# Patient Record
Sex: FEMALE | Race: WHITE | NOT HISPANIC OR LATINO | Employment: FULL TIME | URBAN - METROPOLITAN AREA
[De-identification: names, ages, dates, MRNs, and addresses within clinical notes are randomized per-mention and may not be internally consistent; named-entity substitution may affect disease eponyms.]

---

## 2018-11-28 ENCOUNTER — TRANSFERRED RECORDS (OUTPATIENT)
Dept: HEALTH INFORMATION MANAGEMENT | Facility: CLINIC | Age: 48
End: 2018-11-28

## 2019-12-02 ENCOUNTER — TRANSFERRED RECORDS (OUTPATIENT)
Dept: HEALTH INFORMATION MANAGEMENT | Facility: CLINIC | Age: 49
End: 2019-12-02

## 2021-06-28 ENCOUNTER — TRANSFERRED RECORDS (OUTPATIENT)
Dept: HEALTH INFORMATION MANAGEMENT | Facility: CLINIC | Age: 51
End: 2021-06-28

## 2021-08-16 ENCOUNTER — TRANSCRIBE ORDERS (OUTPATIENT)
Dept: OTHER | Age: 51
End: 2021-08-16

## 2021-08-16 DIAGNOSIS — C50.919 BREAST CANCER (H): Primary | ICD-10-CM

## 2021-08-20 ENCOUNTER — PATIENT OUTREACH (OUTPATIENT)
Dept: ONCOLOGY | Facility: CLINIC | Age: 51
End: 2021-08-20

## 2021-08-20 NOTE — PROGRESS NOTES
Areli returned writer's call responding to voice message left on 8/18.   Areli is a former patient of Dr. Carson's at [facility] and would like to start a new prescription for Estrace vaginal cream.  Primary care provider would like her to screen this with medical oncologist before writing that prescription.      Patient was diagnosed with right breast IDC in 2014.  She had Oncotype of 30 and received 3 cycles of AC, then Taxol and Herceptin.  She completed Herceptin on 9/22/15.  She had radiation therapy from 1/12/2015-2/26/2015.    Areli was released from care by Dr. Carson recently and recognizes the practitioner is between clinics at the moment starting at [facility] in September.  Patient would prefer not to wait that long.  We discussed options for other medical oncology providers but this would still be a consult.      Since she has been a patient at [facility], it was recommended she call that clinic and see is a nurse practitioner or former colleague of Dr. Medina would consider this question.  Areli was agreeable to that plan and will update writer of her progress. Calls welcomed.

## 2021-08-26 NOTE — PROGRESS NOTES
Telephoned and left voice message for patient requesting call back to learn if she still needs consult with [facility].

## 2022-08-19 ENCOUNTER — TRANSCRIBE ORDERS (OUTPATIENT)
Dept: OTHER | Age: 52
End: 2022-08-19

## 2022-08-19 DIAGNOSIS — Z85.3 HISTORY OF BREAST CANCER: Primary | ICD-10-CM

## 2022-08-19 NOTE — PROGRESS NOTES
Patient has self referred again.    New Patient Oncology Nurse Navigator Note     Referring provider: self     Referring Clinic/Organization: Patient is following Dr. Carson from [facility]     Referred to (specialty:) Medical Oncology     Requested provider (if applicable): Dr. Mike Carson     Date Referral Received: August 19, 2022     Evaluation for:  Breast cancer     Clinical History (per Nurse review of records provided):      5/27/2014 -   RIGHT BREAST, 5 O'CLOCK AND 5 CM FROM NIPPLE, ULTRASOUND-GUIDED CORE   BIOPSY:   1. Infiltrating ductal carcinoma      a. [place] grade: II of III, Dolores score: 6 of 9      b. Angio-lymphatic invasion: Suspicious      c. Associated DCIS: Present      d. Subtype of DCIS: Cribriform      e. Grade of DCIS: 3   2. Estrogen receptor is positive (90% nuclear staining, moderate      intensity), progesterone receptor is positive (35% nuclear staining,      moderate intensity)   3. HER2 FISH is equivocal per outside report, see comment     COMMENT   The average number of HER2 signals per nucleus is 5.3 and the average   number of centromeres 17 signals per nucleus is 3.1 (ratio HER2/CEN17 =   1.7). This is classified as equivocal result. Additional FISH assay was   performed with additional probes (RA/1 locus) which also yielded equivocal   results. Immunohistochemical stain for HER2 is warranted on this core   biopsy if the block becomes available. Alternatively, repeat testing may be   performed on the surgical specimen.     6/2/2014 - Met with Brittnee Carreno of [facility] for genetic counseling. She elected to proceed with BRCAPlus testing.  Genetic Testing Result: NEGATIVE  Areli is negative for mutations in BRCA1, BRCA2, TP53, CDH1, STK11, PTEN. No mutations were found in any of these six genes analyzed. She does not have Hereditary Breast and Ovarian Cancer Syndrome (BRCA1, BRCA2), Li Fraumeni Syndrome (TP53), Hereditary Diffuse Gastric  Cancer (CDH1), Peutz-Jegher's Syndrome (STK11), or Cowden Syndrome (PTEN).    Met with Dr. Majo Watkins on 6/9/2014.     Dr. Merary Szymanski ordered Oncotype on Specimen [id number] with RS 30 (report is on chart)    6/30/2014 - Dr. Watkins performed right lumpectomy IDC, 1.7 cm, Grade 3, 1/2 lymph nodes involved, with 0.4 cm tumor. ER+ (30%), [place] positive (35%),     AC started on 7/22/2014 and finished after three cycles because of progressive symptoms.  Taxol and Herceptin on 9/16/2014 and completed Taxol on 12/2/2014. Herceptin completed 9/22/2015.  Started Femara 9/23/15 to 10/5/15, then restarted on 12/15/15.      Radiation therapy 1/12/2015 through 2/26/15.    Letrozole 2015, then anastrozole December 2018 through June 2019, then exemestane.    Last visit with Dr. Carson appears to have been on 6/8/2020.     Records Location: Care Everywhere, Media and See Bookmarked material     Records Needed: [facility] from [id number] (assume most recent in 2021)  Most recent Yam[facility] note in Media is 6/8/2020

## 2022-08-25 ENCOUNTER — PATIENT OUTREACH (OUTPATIENT)
Dept: ONCOLOGY | Facility: CLINIC | Age: 52
End: 2022-08-25

## 2022-08-25 ENCOUNTER — TELEPHONE (OUTPATIENT)
Dept: ONCOLOGY | Facility: CLINIC | Age: 52
End: 2022-08-25

## 2022-08-25 NOTE — PROGRESS NOTES
"RN CARE COORDINATION  Surgical Oncology       Incoming Call:   Received call from Areli stating she had been given Dr. Ethan Dee's name by  [facility]. She would like to come off her maintenance drug that she has been on for eight years. She was then told by her insurance company Dr. Carson is not in network for her now that Dr. Carson is with [facility]. She was given Dr. Dee's name by [employer] and was told he should be able to \"consult\" with Dr. Carson about her history and possible treatment recommendations.     Discussed with Areli that Dr. Dee is a Surgical Oncologist not a Medical Oncologist and would not be an appropriate provider to discuss her treatment plan. Encouraged Areli to reach back out to her insurance company since Dr. Carson should be in network with [employer]. Reviewed with Areli the process for being set up with a new oncologist in the event she could not be seen by Dr. Carson. Provided New Patient Intake phone number and encouraged her to call and speak with Chiara Santo RN. Chiara is the Nurse Navigator for breast cancer and has spoken to Areli in the past. Areli verbalized understanding of the recommendations.         ANGELINA Bingham, RN  RN Care Coordinator  [facility]  Surgical Oncology      "

## 2022-08-26 NOTE — PROGRESS NOTES
When contacted by New Patient Scheduling patient declined to schedule and requested call back from writer.  Telephoned and left voice message for patient requesting call back.

## 2022-08-26 NOTE — PROGRESS NOTES
Areli telephoned returning call.   She has been on exemestane for 8 years.  Her primary care provider has suggested she seek the advice of medical oncologist to determine the benefits of continuing medication for a full ten years.  Patient is interested in seeing Dr. Carson for discussion of duration of time she should continue using exemestane.      Patient has been informed by her insurance company that Dr. Carson's colleagues are in network, but she is not.  Areli is not the first patient who has stated this.      Areli will call cost of care estimates line to learn her out of pocket obligation if she were to see Dr. Carson before update with [employer] insurance is accomplished.  She will assess that and call writer if she would like to proceed with scheduling.  I did inform her Dr. Carson would want to see her in person, not virtually for this.  Chiara Santo RN

## 2022-09-07 ENCOUNTER — DOCUMENTATION ONLY (OUTPATIENT)
Dept: ONCOLOGY | Facility: CLINIC | Age: 52
End: 2022-09-07

## 2022-09-07 NOTE — PROGRESS NOTES
Action    Action Taken 9/7/2022 10:13AM NIKKI - IB Message from JAG Guadarrama   I called [facility] to see if they have an RANDY on file for the pt.   [phone]  -I spoke to Jorge and she confirmed that they will need a new RANDY.     I called pt Areli- she will work on a new RANDY for [facility]      The Internet cut out- I left a vm for Areli requesting a call back- she will need to provide her email address.     11:55AM NIKKI Lamesa called me back- I emailed her the RANDY form.   Email: [email or web address]    9/14/2022 3:17pm NIKKI    Areli's RANDY is illegible. I called Areli to let her know - she will work on sending a better copy of the release soon.     9/15/2022 3:20pm NIKKI   I faxed pt Areli's RANDY form to [facility] along with a formal request form.     9/16/2022 9:21AM NIKKI   I faxed 80 pages of [facility] records to [facility].

## 2022-09-15 ENCOUNTER — TRANSFERRED RECORDS (OUTPATIENT)
Dept: HEALTH INFORMATION MANAGEMENT | Facility: CLINIC | Age: 52
End: 2022-09-15

## 2022-09-23 NOTE — PROGRESS NOTES
Telephoned and left voice message for patient to update her on [facility] network and Dr. Carson (no progress but [facility] communicating with carrier).  Welcomed calls but informed her referral will be placed on hold until we hear from her.  Referral can be reactivated if she wishes to proceed.   9/23/22 - 12:50 - Patient called back and was transferred to New Patient Scheduling to complete scheduling with Dr. Carson.  Chiara Santo RN

## 2022-10-03 NOTE — PROGRESS NOTES
RECORDS STATUS - BREAST    RECORDS REQUESTED FROM: [facility]/ [facility]    DATE REQUESTED: 10/4/2022    Action    Action Taken 10/3/2022 7:23AM NIKKI     I called [facility] IM[facility] dept Ph: [phone] - they will push images to [facility] PACS     I called [facility] to see if they have an RANDY on file for pt Areli   [phone] - unavailable. I tried calling two more times but still no luck.     I called pt Areli - unavailable    9:09AM KECHAN   I called [facility] again.. I spoke to Jorge in the Medical Records Dept and she will send recent records. The most recent office note is from June 8th 2020. She will send records to the fax: [phone] Attn: Anne     9:21AM KECHAN   I called our internal file room to have the breast images resolved in PACS. I resolved the other images from [facility]. [phone]    10/3/2022 11:51AM KECHAN   I received records from [facility] and faxed them off to [facility].       NOTES DETAILS STATUS   OFFICE NOTE from referring provider     OFFICE NOTE from medical oncologist I faxed records from [facility] to [facility] on 10/3/2022 11:52AM NIKKI Some [facility] Records are in [facility]   OFFICE NOTE from surgeon Complete See Breast Biopsy in EPIC   OFFICE NOTE from radiation oncologist     DISCHARGE SUMMARY from hospital     DISCHARGE REPORT from the ER     OPERATIVE REPORT Complete See Breast Biopsy in EPIC   MEDICATION LIST Complete [facility]   CLINICAL TRIAL TREATMENTS TO DATE     LABS     REQUEST BLOCKS FOR ALL BREAST CANCER PTS     PATHOLOGY REPORTS  (Tissue diagnosis, Stage, ER/NV percentage positive and intensity of staining, HER2 IHC, FISH, and all biopsies from breast and any distant metastasis)                 Complete- See Breast Biopsy in EPIC 5/27/2014  Right breast at 5:00 - 5 cm from nipple, ultrasound guided core biopsy -   [place] grade 2  invasive ductal carcinoma    GENONOMIC TESTING     TYPE:   (Next Generation Sequencing, including Foundation One testing, and Oncotype score) Complete Labs  last updated on 6/28/2022    Her 2 Tatianna Fish 5/27/2014    IMAGING (NEED IMAGES & REPORT)     CT SCANS Complete - [facility]  CT Cardiac 1/17/2022   MRI Complete - [facility]  MRI Breast 3/19/2018    MAMMO Complete - [facility]  11/29/2021 more in PACS   ULTRASOUND Complete- [facility]  US Breast 9/8/2016   PET     BONE SCAN     BRAIN MRI

## 2022-10-04 ENCOUNTER — PRE VISIT (OUTPATIENT)
Dept: ONCOLOGY | Facility: CLINIC | Age: 52
End: 2022-10-04

## 2022-10-04 ENCOUNTER — ONCOLOGY VISIT (OUTPATIENT)
Dept: ONCOLOGY | Facility: CLINIC | Age: 52
End: 2022-10-04
Attending: INTERNAL MEDICINE
Payer: COMMERCIAL

## 2022-10-04 VITALS
WEIGHT: 199.6 LBS | SYSTOLIC BLOOD PRESSURE: 127 MMHG | HEART RATE: 71 BPM | OXYGEN SATURATION: 98 % | TEMPERATURE: 97.9 F | RESPIRATION RATE: 16 BRPM | HEIGHT: 67 IN | BODY MASS INDEX: 31.33 KG/M2 | DIASTOLIC BLOOD PRESSURE: 88 MMHG

## 2022-10-04 DIAGNOSIS — C50.211 MALIGNANT NEOPLASM OF UPPER-INNER QUADRANT OF RIGHT BREAST IN FEMALE, ESTROGEN RECEPTOR POSITIVE (H): Primary | ICD-10-CM

## 2022-10-04 DIAGNOSIS — Z17.0 MALIGNANT NEOPLASM OF UPPER-INNER QUADRANT OF RIGHT BREAST IN FEMALE, ESTROGEN RECEPTOR POSITIVE (H): Primary | ICD-10-CM

## 2022-10-04 PROCEDURE — 99204 OFFICE O/P NEW MOD 45 MIN: CPT | Performed by: INTERNAL MEDICINE

## 2022-10-04 PROCEDURE — G0463 HOSPITAL OUTPT CLINIC VISIT: HCPCS

## 2022-10-04 RX ORDER — SIMVASTATIN 20 MG
TABLET ORAL
COMMUNITY
Start: 2022-09-13 | End: 2022-10-16

## 2022-10-04 RX ORDER — OCTANOIC ACID 100 %
LIQUID (ML) MISCELLANEOUS
COMMUNITY

## 2022-10-04 RX ORDER — EXEMESTANE 25 MG/1
TABLET ORAL
COMMUNITY
Start: 2022-09-14 | End: 2022-10-04

## 2022-10-04 ASSESSMENT — PAIN SCALES - GENERAL: PAINLEVEL: NO PAIN (0)

## 2022-10-04 NOTE — LETTER
"    10/4/2022         RE: Areli Hickman  [address]  [place] [address]        Dear Colleague,    Thank you for referring your patient, Areli Hickman, to the [facility]. Please see a copy of my visit note below.    Oncology Rooming Note    October 4, 2022 10:09 AM   Areli Hickman is a 52 year old female who presents for:    Chief Complaint   Patient presents with     Oncology Clinic Visit     Breast cancer (H)     Initial Vitals: /88   Pulse 71   Temp 97.9  F (36.6  C) (Oral)   Resp 16   Ht 1.689 m (5' 6.5\")   Wt 90.5 kg (199 lb 9.6 oz)   SpO2 98%   BMI 31.73 kg/m   Estimated body mass index is 31.73 kg/m  as calculated from the following:    Height as of this encounter: 1.689 m (5' 6.5\").    Weight as of this encounter: 90.5 kg (199 lb 9.6 oz). Body surface area is 2.06 meters squared.  No Pain (0) Comment: Data Unavailable   No LMP recorded.  Allergies reviewed: Yes  Medications reviewed: Yes    Medications: Medication refills not needed today.  Pharmacy name entered into [employer]: [facility] #[id number] [id number] [id number]    Clinical concerns: None       Sarah Butt MA                [facility]    Hematology/Oncology New Patient Note      Today's Date: 10/04/22    Reason for Consult: breast cancer      HISTORY OF PRESENT ILLNESS: Areli  Is a very pleasant 52 year old female with a history Of breast cancer.    Oncologic history is as follows    1.Right breast invasive ductal carcinoma Dolores grade 2 ER/OR positive HER2/matt equivocal    3. HER2 FISH is equivocal per outside report, see comment      COMMENT   The average number of HER2 signals per nucleus is 5.3 and the average   number of centromeres 17 signals per nucleus is 3.1 (ratio HER2/CEN17 =   1.7). This is classified as equivocal result. Additional FISH assay was   performed with additional probes (RA/1 locus) which also yielded equivocal   results. " Immunohistochemical stain for HER2 is warranted on this core   biopsy if the block becomes available. Alternatively, repeat testing may be   performed on the surgical specimen.       2.  Status post lumpectomy on June 30, 2014 final pathology showed invasive ductal carcinoma 1.7 cm grade 3 1 out of 2 lymph nodes involved 0.4 cm tumor in the lymph node ER +30% TN +35% HER2/matt negative  3.  Status post AC followed by Taxol Herceptin completed in 2015 started Femara from September 2015 to October 2015 then restarted in December 2015  4.  Radiation therapy from January 2015 to February 2015  5.  Letrozole 2015 on anastrozole December 2018 through 2019 and exemestane currently on exemestane  5 dexa November 2021 normal  6 mammogram 11/2021 normal     Interval history:Areli is here to establish care.  She had a lot of hot flashes on the exemestane and is currently taking it.  She feels otherwise well.We also discussed her cholesterol in detail and she was asked to increase her Crestor in January due to slightly elevated LDL and states that she has any myalgias related to the Crestor.  She would like to go down on her hyperlipidemia medications.      REVIEW OF SYSTEMS:   14 point ROS was reviewed and is negative other than as noted above in HPI.       HOME MEDICATIONS:  Current Outpatient Medications   Medication Sig Dispense Refill     Calcium-Magnesium-Vitamin D (CALCIUM MAGNESIUM PO) Take by mouth 3 times daily       Caprylic Acid LIQD        Cholecalciferol (VITAMIN D) 1000 UNITS capsule Take 2 capsules by mouth daily       Flaxseed, Linseed, (FLAX SEED OIL PO) 2 capsules daily       glucosamine-chondroitin 500-400 MG CAPS Take 1 capsule by mouth daily       Multiple vitamin TABS Take by mouth daily       Omega-3 Fatty Acids (OMEGA-3 FISH OIL PO)        propranolol ER (INDERAL LA) 60 MG 24 hr capsule Take 120 mg by mouth daily        simvastatin (ZOCOR) 20 MG tablet        TURMERIC PO 1 capsules daily            ALLERGIES:  No Known Allergies      PAST MEDICAL HISTORY:  Past Medical History:   Diagnosis Date     Breast cancer (H)      Essential tremor      NO ACTIVE PROBLEMS      Shingles          PAST SURGICAL HISTORY:  Past Surgical History:   Procedure Laterality Date     NO HISTORY OF SURGERY       RHINOPLASTY           SOCIAL HISTORY:  Social History     Socioeconomic History     Marital status:      Spouse name: Not on file     Number of children: Not on file     Years of education: Not on file     Highest education level: Not on file   Occupational History     Not on file   Tobacco Use     Smoking status: Never Smoker     Smokeless tobacco: Not on file   Substance and Sexual Activity     Alcohol use: No     Drug use: No     Sexual activity: Yes     Partners: Male   Other Topics Concern     Parent/sibling w/ CABG, MI or angioplasty before 65F 55M? Not Asked      Service Not Asked     Blood Transfusions Not Asked     Caffeine Concern No     Comment: occ      Occupational Exposure Not Asked     Hobby Hazards Not Asked     Sleep Concern No     Stress Concern No     Weight Concern No     Special Diet No     Back Care Not Asked     Exercise Yes     Comment: 2x weekly goes to gym      Bike Helmet Not Asked     Seat Belt Yes     Self-Exams Not Asked   Social History Narrative     Not on file     Social Determinants of Health     Financial Resource Strain: Not on file   Food Insecurity: Not on file   Transportation Needs: Not on file   Physical Activity: Not on file   Stress: Not on file   Social Connections: Not on file   Intimate Partner Violence: Not on file   Housing Stability: Not on file         FAMILY HISTORY:  Family History   Problem Relation Age of Onset     Hypertension Mother      Obesity Mother      Lipids Father      Obesity Sister      Cerebrovascular Disease Sister         ?     Cancer Other      Cardiovascular Other          PHYSICAL EXAM:  Vital signs:  /88   Pulse 71   Temp  "97.9  F (36.6  C) (Oral)   Resp 16   Ht 1.689 m (5' 6.5\")   Wt 90.5 kg (199 lb 9.6 oz)   SpO2 98%   BMI 31.73 kg/m     ECO  GENERAL/CONSTITUTIONAL: No acute distress.  EYES: Pupils are equal, round, and react to light and accommodation. Extraocular movements intact.  No scleral icterus.  ENT/MOUTH: Neck supple. Oropharynx clear, no mucositis.  LYMPH: No anterior cervical, posterior cervical, supraclavicular, axillary or inguinal adenopathy.   RESPIRATORY: Clear to auscultation bilaterally. No crackles or wheezing.   CARDIOVASCULAR: Regular rate and rhythm without murmurs, gallops, or rubs.  GASTROINTESTINAL: No hepatosplenomegaly, masses, or tenderness. The patient has normal bowel sounds. No guarding.  No distention.  MUSCULOSKELETAL: Warm and well-perfused, no cyanosis, clubbing, or edema.  NEUROLOGIC: Cranial nerves II-XII are intact. Alert, oriented, answers questions appropriately.  INTEGUMENTARY: No rashes or jaundice.  GAIT: Steady, does not use assistive device  Bilateral breast exam is normal    LABS:  CBC RESULTS:   Recent Labs   Pending    PATHOLOGY:  As per hPI      IMAGING:  Mammogram 2021 normal     ASSESSMENT/PLAN:  Areli Hickman is a 52 year old female with breast cancer     1) breast cancer stop Exemestane she has done 8 years of therapy. No data to support further treatment would be beneficial. mammogram in November    2) cholesteroll: Hyperlipidemia may have been related to the exemestane.  Asked her to stop the medication and have it rechecked by her primary care physician she may consider decreasing the Crestor from 20 mg to 10 mg with a recheck and if stable at that point she can remain on 10 mg a day she will take this further up with her primary care physician    3.  Bone density scan bone health).  Continue calcium and vitamin D    See me in a year, will schedule lab draw in July and see me in august   Mike Carson MD  Hematology/Oncology  [facility]      Again, thank you for allowing me to participate in the care of your patient.        Sincerely,        Mike Carson MD

## 2022-10-04 NOTE — PROGRESS NOTES
"Oncology Rooming Note    October 4, 2022 10:09 AM   Areli Hickman is a 52 year old female who presents for:    Chief Complaint   Patient presents with     Oncology Clinic Visit     Breast cancer (H)     Initial Vitals: /88   Pulse 71   Temp 97.9  F (36.6  C) (Oral)   Resp 16   Ht 1.689 m (5' 6.5\")   Wt 90.5 kg (199 lb 9.6 oz)   SpO2 98%   BMI 31.73 kg/m   Estimated body mass index is 31.73 kg/m  as calculated from the following:    Height as of this encounter: 1.689 m (5' 6.5\").    Weight as of this encounter: 90.5 kg (199 lb 9.6 oz). Body surface area is 2.06 meters squared.  No Pain (0) Comment: Data Unavailable   No LMP recorded.  Allergies reviewed: Yes  Medications reviewed: Yes    Medications: Medication refills not needed today.  Pharmacy name entered into [employer]: [facility] #[id number] [place] [id number] [id number]    Clinical concerns: None       Sarah Butt MA              "

## 2022-10-05 NOTE — PROGRESS NOTES
[facility]    Hematology/Oncology New Patient Note      Today's Date: 10/04/22    Reason for Consult: breast cancer      HISTORY OF PRESENT ILLNESS: Areli  Is a very pleasant 52 year old female with a history Of breast cancer.    Oncologic history is as follows    1.Right breast invasive ductal carcinoma [place] grade 2 ER/CT positive HER2/matt equivocal    3. HER2 FISH is equivocal per outside report, see comment      COMMENT   The average number of HER2 signals per nucleus is 5.3 and the average   number of centromeres 17 signals per nucleus is 3.1 (ratio HER2/CEN17 =   1.7). This is classified as equivocal result. Additional FISH assay was   performed with additional probes (RA/1 locus) which also yielded equivocal   results. Immunohistochemical stain for HER2 is warranted on this core   biopsy if the block becomes available. Alternatively, repeat testing may be   performed on the surgical specimen.       2.  Status post lumpectomy on June 30, 2014 final pathology showed invasive ductal carcinoma 1.7 cm grade 3 1 out of 2 lymph nodes involved 0.4 cm tumor in the lymph node ER +30% CT +35% HER2/matt negative  3.  Status post AC followed by Taxol Herceptin completed in 2015 started Femara from September 2015 to October 2015 then restarted in December 2015  4.  Radiation therapy from January 2015 to February 2015  5.  Letrozole 2015 on anastrozole December 2018 through 2019 and exemestane currently on exemestane  5 dexa November 2021 normal  6 mammogram 11/2021 normal     Interval history:Areli is here to establish care.  She had a lot of hot flashes on the exemestane and is currently taking it.  She feels otherwise well.We also discussed her cholesterol in detail and she was asked to increase her Crestor in January due to slightly elevated LDL and states that she has any myalgias related to the Crestor.  She would like to go down on her hyperlipidemia medications.      REVIEW OF SYSTEMS:    14 point ROS was reviewed and is negative other than as noted above in HPI.       HOME MEDICATIONS:  Current Outpatient Medications   Medication Sig Dispense Refill     Calcium-Magnesium-Vitamin D (CALCIUM MAGNESIUM PO) Take by mouth 3 times daily       Caprylic Acid LIQD        Cholecalciferol (VITAMIN D) 1000 UNITS capsule Take 2 capsules by mouth daily       Flaxseed, Linseed, (FLAX SEED OIL PO) 2 capsules daily       glucosamine-chondroitin 500-400 MG CAPS Take 1 capsule by mouth daily       Multiple vitamin TABS Take by mouth daily       Omega-3 Fatty Acids (OMEGA-3 FISH OIL PO)        propranolol ER (INDERAL LA) 60 MG 24 hr capsule Take 120 mg by mouth daily        simvastatin (ZOCOR) 20 MG tablet        TURMERIC PO 1 capsules daily           ALLERGIES:  No Known Allergies      PAST MEDICAL HISTORY:  Past Medical History:   Diagnosis Date     Breast cancer (H)      Essential tremor      NO ACTIVE PROBLEMS      Shingles          PAST SURGICAL HISTORY:  Past Surgical History:   Procedure Laterality Date     NO HISTORY OF SURGERY       RHINOPLASTY           SOCIAL HISTORY:  Social History     Socioeconomic History     Marital status:      Spouse name: Not on file     Number of children: Not on file     Years of education: Not on file     Highest education level: Not on file   Occupational History     Not on file   Tobacco Use     Smoking status: Never Smoker     Smokeless tobacco: Not on file   Substance and Sexual Activity     Alcohol use: No     Drug use: No     Sexual activity: Yes     Partners: Male   Other Topics Concern     Parent/sibling w/ CABG, MI or angioplasty before 65F 55M? Not Asked      Service Not Asked     Blood Transfusions Not Asked     Caffeine Concern No     Comment: occ      Occupational Exposure Not Asked     Hobby Hazards Not Asked     Sleep Concern No     Stress Concern No     Weight Concern No     Special Diet No     Back Care Not Asked     Exercise Yes     Comment: 2x  "weekly goes to gym      Bike Helmet Not Asked     Seat Belt Yes     Self-Exams Not Asked   Social History Narrative     Not on file     Social Determinants of Health     Financial Resource Strain: Not on file   Food Insecurity: Not on file   Transportation Needs: Not on file   Physical Activity: Not on file   Stress: Not on file   Social Connections: Not on file   Intimate Partner Violence: Not on file   Housing Stability: Not on file         FAMILY HISTORY:  Family History   Problem Relation Age of Onset     Hypertension Mother      Obesity Mother      Lipids Father      Obesity Sister      Cerebrovascular Disease Sister         ?     Cancer Other      Cardiovascular Other          PHYSICAL EXAM:  Vital signs:  /88   Pulse 71   Temp 97.9  F (36.6  C) (Oral)   Resp 16   Ht 1.689 m (5' 6.5\")   Wt 90.5 kg (199 lb 9.6 oz)   SpO2 98%   BMI 31.73 kg/m     ECO  GENERAL/CONSTITUTIONAL: No acute distress.  EYES: Pupils are equal, round, and react to light and accommodation. Extraocular movements intact.  No scleral icterus.  ENT/MOUTH: Neck supple. Oropharynx clear, no mucositis.  LYMPH: No anterior cervical, posterior cervical, supraclavicular, axillary or inguinal adenopathy.   RESPIRATORY: Clear to auscultation bilaterally. No crackles or wheezing.   CARDIOVASCULAR: Regular rate and rhythm without murmurs, gallops, or rubs.  GASTROINTESTINAL: No hepatosplenomegaly, masses, or tenderness. The patient has normal bowel sounds. No guarding.  No distention.  MUSCULOSKELETAL: Warm and well-perfused, no cyanosis, clubbing, or edema.  NEUROLOGIC: Cranial nerves II-XII are intact. Alert, oriented, answers questions appropriately.  INTEGUMENTARY: No rashes or jaundice.  GAIT: Steady, does not use assistive device  Bilateral breast exam is normal    LABS:  CBC RESULTS:   Recent Labs   Pending    PATHOLOGY:  As per hPI      IMAGING:  Mammogram 2021 normal     ASSESSMENT/PLAN:  Areli Hickman is a 52 year old " female with breast cancer     1) breast cancer stop Exemestane she has done 8 years of therapy. No data to support further treatment would be beneficial. mammogram in November    2) cholesteroll: Hyperlipidemia may have been related to the exemestane.  Asked her to stop the medication and have it rechecked by her primary care physician she may consider decreasing the Crestor from 20 mg to 10 mg with a recheck and if stable at that point she can remain on 10 mg a day she will take this further up with her primary care physician    3.  Bone density scan bone health).  Continue calcium and vitamin D    See me in a year, will schedule lab draw in July and see me in august   Mike Carson MD  Hematology/Oncology  [facility]

## 2022-10-07 ENCOUNTER — MYC MEDICAL ADVICE (OUTPATIENT)
Dept: ONCOLOGY | Facility: CLINIC | Age: 52
End: 2022-10-07

## 2022-10-07 NOTE — TELEPHONE ENCOUNTER
Left voicemail (1st) and sent a [employer] message (1st) for scheduling.     - Mammogram in November, 2022   - See Dr. Carson in 1 year (around 08/23) with labs at your PCP (primary care provider) in 06/23 or 07/23

## 2022-10-13 ENCOUNTER — MYC MEDICAL ADVICE (OUTPATIENT)
Dept: ONCOLOGY | Facility: CLINIC | Age: 52
End: 2022-10-13

## 2022-10-13 DIAGNOSIS — C50.211 MALIGNANT NEOPLASM OF UPPER-INNER QUADRANT OF RIGHT BREAST IN FEMALE, ESTROGEN RECEPTOR POSITIVE (H): Primary | ICD-10-CM

## 2022-10-13 DIAGNOSIS — Z17.0 MALIGNANT NEOPLASM OF UPPER-INNER QUADRANT OF RIGHT BREAST IN FEMALE, ESTROGEN RECEPTOR POSITIVE (H): Primary | ICD-10-CM

## 2022-10-16 DIAGNOSIS — Z17.0 MALIGNANT NEOPLASM OF UPPER-INNER QUADRANT OF RIGHT BREAST IN FEMALE, ESTROGEN RECEPTOR POSITIVE (H): Primary | ICD-10-CM

## 2022-10-16 DIAGNOSIS — C50.211 MALIGNANT NEOPLASM OF UPPER-INNER QUADRANT OF RIGHT BREAST IN FEMALE, ESTROGEN RECEPTOR POSITIVE (H): Primary | ICD-10-CM

## 2022-10-16 RX ORDER — SIMVASTATIN 20 MG
10 TABLET ORAL AT BEDTIME
Qty: 90 TABLET | Refills: 3 | Status: SHIPPED | OUTPATIENT
Start: 2022-10-16 | End: 2022-10-19 | Stop reason: DRUGHIGH

## 2022-10-18 NOTE — TELEPHONE ENCOUNTER
Patient is calling requesting a new script of simvastatin 10 mg sent to the pharmacy. She is not able to cut the 20 mg in half due to hand tremors. Pharmacy does have the 10 mg dose in stock.They are not able to dispense the 10 mg without a new script. Patient is now out of medication. Please review and send in new script when able.     Charis Martinez RN, BSN, PHN  [facility]

## 2022-10-19 DIAGNOSIS — E78.5 ELEVATED LIPIDS: Primary | ICD-10-CM

## 2022-10-19 RX ORDER — SIMVASTATIN 10 MG
10 TABLET ORAL AT BEDTIME
Qty: 90 TABLET | Refills: 3 | Status: SHIPPED | OUTPATIENT
Start: 2022-10-19

## 2022-10-19 RX ORDER — SIMVASTATIN 10 MG
10 TABLET ORAL AT BEDTIME
Qty: 90 TABLET | Refills: 1 | Status: SHIPPED | OUTPATIENT
Start: 2022-10-19

## 2023-01-14 ENCOUNTER — HEALTH MAINTENANCE LETTER (OUTPATIENT)
Age: 53
End: 2023-01-14

## 2023-09-24 ENCOUNTER — HEALTH MAINTENANCE LETTER (OUTPATIENT)
Age: 53
End: 2023-09-24

## 2023-10-12 ENCOUNTER — TELEPHONE (OUTPATIENT)
Dept: ONCOLOGY | Facility: CLINIC | Age: 53
End: 2023-10-12
Payer: COMMERCIAL

## 2023-10-12 NOTE — TELEPHONE ENCOUNTER
Areli called clinic as she had an exam with her primary NP who noticed that her Iron level was elevated. Areli is also due for follow up labs and exam with Dr. Carson. She is aware that Dr. Carson is a hematologist also and can address the elevated Iron level. She is schedule for more labs and a liver ultrasound prior to her visit with Dr. Carson. She is scheduled for follow up with Dr. Carosn on 110/10/23. Afsaneh Khalil RN,BSN,OCN,CBCN

## 2023-11-10 ENCOUNTER — ONCOLOGY VISIT (OUTPATIENT)
Dept: ONCOLOGY | Facility: CLINIC | Age: 53
End: 2023-11-10
Attending: INTERNAL MEDICINE
Payer: COMMERCIAL

## 2023-11-10 VITALS
WEIGHT: 149.2 LBS | DIASTOLIC BLOOD PRESSURE: 73 MMHG | SYSTOLIC BLOOD PRESSURE: 113 MMHG | HEART RATE: 65 BPM | HEIGHT: 66 IN | RESPIRATION RATE: 16 BRPM | BODY MASS INDEX: 23.98 KG/M2 | OXYGEN SATURATION: 98 %

## 2023-11-10 DIAGNOSIS — R79.89 ELEVATED FERRITIN: ICD-10-CM

## 2023-11-10 DIAGNOSIS — E78.00 HYPERCHOLESTEREMIA: ICD-10-CM

## 2023-11-10 DIAGNOSIS — Z17.0 MALIGNANT NEOPLASM OF UPPER-INNER QUADRANT OF RIGHT BREAST IN FEMALE, ESTROGEN RECEPTOR POSITIVE (H): Primary | ICD-10-CM

## 2023-11-10 DIAGNOSIS — C50.211 MALIGNANT NEOPLASM OF UPPER-INNER QUADRANT OF RIGHT BREAST IN FEMALE, ESTROGEN RECEPTOR POSITIVE (H): Primary | ICD-10-CM

## 2023-11-10 LAB — FERRITIN SERPL-MCNC: 433 NG/ML (ref 11–328)

## 2023-11-10 PROCEDURE — G0463 HOSPITAL OUTPT CLINIC VISIT: HCPCS | Performed by: INTERNAL MEDICINE

## 2023-11-10 PROCEDURE — 36415 COLL VENOUS BLD VENIPUNCTURE: CPT | Performed by: INTERNAL MEDICINE

## 2023-11-10 PROCEDURE — 82728 ASSAY OF FERRITIN: CPT | Performed by: INTERNAL MEDICINE

## 2023-11-10 PROCEDURE — 99215 OFFICE O/P EST HI 40 MIN: CPT | Performed by: INTERNAL MEDICINE

## 2023-11-10 RX ORDER — SIMVASTATIN 10 MG
10 TABLET ORAL AT BEDTIME
Qty: 90 TABLET | Refills: 3 | Status: SHIPPED | OUTPATIENT
Start: 2023-11-10

## 2023-11-10 NOTE — LETTER
11/10/2023         RE: Areli Hickman  [address]  [place] [address]        Dear Colleague,    Thank you for referring your patient, Areli Hickman, to the [facility]. Please see a copy of my visit note below.    [facility]    Hematology/Oncology return patient note    Today's Date: 11/10/2022  Reason for Consult: breast cancer      HISTORY OF PRESENT ILLNESS: Areli  Is a very pleasant 53 year old female with a history Of breast cancer.    Oncologic history is as follows    1.Right breast invasive ductal carcinoma [place] grade 2 ER/[place] positive HER2/matt equivocal    3. HER2 FISH is equivocal per outside report, see comment      COMMENT   The average number of HER2 signals per nucleus is 5.3 and the average   number of centromeres 17 signals per nucleus is 3.1 (ratio HER2/CEN17 =   1.7). This is classified as equivocal result. Additional FISH assay was   performed with additional probes (RA/1 locus) which also yielded equivocal   results. Immunohistochemical stain for HER2 is warranted on this core   biopsy if the block becomes available. Alternatively, repeat testing may be   performed on the surgical specimen.       2.  Status post lumpectomy on June 30, 2014 final pathology showed invasive ductal carcinoma 1.7 cm grade 3 1 out of 2 lymph nodes involved 0.4 cm tumor in the lymph node ER +30% [place] +35% HER2/matt negative  3.  Status post AC followed by Taxol Herceptin completed in 2015 started Femara from September 2015 to October 2015 then restarted in December 2015  4.  Radiation therapy from January 2015 to February 2015  5.  Letrozole 2015 on anastrozole December 2018 through 2019 and exemestane Status post exemestane completed in October 2022 She completed 8 years of therapy  5 dexa November 2021 normal  6 mammogram 11/2021 normal     Interval history:Areli is here for follow up. She had elevated Ferritin She went to her primary care physician as she  was having significant amount of hair loss.  She is on a shake diet and has lost about 60 pounds over the last year.  Ferritin was elevated at 461 2 years ago it was at 367.  She stopped taking the statins last year on her own.   Her iron sat is normal . She is seeing derm and follow up and has a new ear area that was biopsied    REVIEW OF SYSTEMS:   14 point ROS was reviewed and is negative other than as noted above in HPI.       HOME MEDICATIONS:  Current Outpatient Medications   Medication Sig Dispense Refill     Calcium-Magnesium-Vitamin D (CALCIUM MAGNESIUM PO) Take by mouth 3 times daily       Caprylic Acid LIQD        Cholecalciferol (VITAMIN D) 1000 UNITS capsule Take 2 capsules by mouth daily       Flaxseed, Linseed, (FLAX SEED OIL PO) 2 capsules daily       glucosamine-chondroitin 500-400 MG CAPS Take 1 capsule by mouth daily       propranolol ER (INDERAL LA) 60 MG 24 hr capsule Take 120 mg by mouth daily        simvastatin (ZOCOR) 10 MG tablet Take 1 tablet (10 mg) by mouth At Bedtime 90 tablet 3     TURMERIC PO 1 capsules daily       simvastatin (ZOCOR) 10 MG tablet Take 1 tablet (10 mg) by mouth At Bedtime (Patient not taking: Reported on 11/10/2023) 90 tablet 1         ALLERGIES:  No Known Allergies      PAST MEDICAL HISTORY:  Past Medical History:   Diagnosis Date     Breast cancer (H)      Essential tremor      NO ACTIVE PROBLEMS      Shingles          PAST SURGICAL HISTORY:  Past Surgical History:   Procedure Laterality Date     NO HISTORY OF SURGERY       RHINOPLASTY           SOCIAL HISTORY:  Social History     Socioeconomic History     Marital status:      Spouse name: Not on file     Number of children: Not on file     Years of education: Not on file     Highest education level: Not on file   Occupational History     Not on file   Tobacco Use     Smoking status: Never     Smokeless tobacco: Not on file   Substance and Sexual Activity     Alcohol use: No     Drug use: No     Sexual  activity: Yes     Partners: Male   Other Topics Concern     Parent/sibling w/ CABG, MI or angioplasty before 65F 55M? Not Asked      Service Not Asked     Blood Transfusions Not Asked     Caffeine Concern No     Comment: occ      Occupational Exposure Not Asked     Hobby Hazards Not Asked     Sleep Concern No     Stress Concern No     Weight Concern No     Special Diet No     Back Care Not Asked     Exercise Yes     Comment: 2x weekly goes to gym      Bike Helmet Not Asked     Seat Belt Yes     Self-Exams Not Asked   Social History Narrative     Not on file     Social Determinants of Health     Financial Resource Strain: Not on file   Food Insecurity: Not on file   Transportation Needs: Not on file   Physical Activity: Not on file   Stress: Not on file   Social Connections: Not on file   Interpersonal Safety: Not on file   Housing Stability: Not on file         FAMILY HISTORY:  Family History   Problem Relation Age of Onset     Hypertension Mother      Obesity Mother      Lipids Father      Obesity Sister      Cerebrovascular Disease Sister         ?     Cancer Other      Cardiovascular Other          PHYSICAL EXAM:  Vital signs:  noted   ECO  GENERAL/CONSTITUTIONAL: No acute distress.  EYES: Pupils are equal, round, and react to light and accommodation. Extraocular movements intact.  No scleral icterus.  ENT/MOUTH: Neck supple. Oropharynx clear, no mucositis.  LYMPH: No anterior cervical, posterior cervical, supraclavicular, axillary or inguinal adenopathy.   RESPIRATORY: Clear to auscultation bilaterally. No crackles or wheezing.   CARDIOVASCULAR: Regular rate and rhythm without murmurs, gallops, or rubs.  GASTROINTESTINAL: No hepatosplenomegaly, masses, or tenderness. The patient has normal bowel sounds. No guarding.  No distention.  MUSCULOSKELETAL: Warm and well-perfused, no cyanosis, clubbing, or edema.  NEUROLOGIC: Cranial nerves II-XII are intact. Alert, oriented, answers questions  "appropriately.  INTEGUMENTARY: No rashes or jaundice.  GAIT: Steady, does not use assistive device  Bilateral breast exam is normal  Biopsy right ear    LABS:  CBC RESULTS:   Recent Labs   Pending    PATHOLOGY:  As per hPI      IMAGING:  Mammogram 11/2021 normal     ASSESSMENT/PLAN:  Areli Hickman is a 52 year old female with breast cancer     1) breast cancer   On surveillance  Labs mammogram and md visit in year    2 ) elevated LDL  Restart zocor  Recheck in a  year    3) elevated ferritin  Liver ultrasound negative for fatty liver  If remains elevated (>> may have had covid) will do ct and bone scan to rule out mets    Total time spent on day of visit, including review of tests, obtaining/reviewing separately obtained history, ordering medications/tests/procedures, communicating with PCP/consultants, and documenting in electronic medical record: 40 minutes   Mike Carson MD  Hematology/Oncology  [facility]    Oncology Rooming Note    November 10, 2023 10:30 AM   Areli Hickman is a 53 year old female who presents for:    Chief Complaint   Patient presents with     Oncology Clinic Visit     Breast cancer (H)     Initial Vitals: /73   Pulse 65   Resp 16   Ht 1.676 m (5' 6\")   Wt 67.7 kg (149 lb 3.2 oz)   SpO2 98%   BMI 24.08 kg/m   Estimated body mass index is 24.08 kg/m  as calculated from the following:    Height as of this encounter: 1.676 m (5' 6\").    Weight as of this encounter: 67.7 kg (149 lb 3.2 oz). Body surface area is 1.78 meters squared.  Data Unavailable Comment: Data Unavailable   No LMP recorded.  Allergies reviewed: Yes  Medications reviewed: Yes    Medications: Medication refills not needed today.  Pharmacy name entered into [employer]: [id number] [id number] [facility] [address]    Clinical concerns: Areli has some concerns about her rising iron levels. She has been taking protein shakes for weight loss and is wondering if this has " contributed.      Sarah Butt MA              Again, thank you for allowing me to participate in the care of your patient.        Sincerely,        Mike Carson MD

## 2023-11-10 NOTE — PROGRESS NOTES
[facility]    Hematology/Oncology return patient note    Today's Date: 11/10/2022  Reason for Consult: breast cancer      HISTORY OF PRESENT ILLNESS: Areli  Is a very pleasant 53 year old female with a history Of breast cancer.    Oncologic history is as follows    1.Right breast invasive ductal carcinoma [place] grade 2 ER/[place] positive HER2/matt equivocal    3. HER2 FISH is equivocal per outside report, see comment      COMMENT   The average number of HER2 signals per nucleus is 5.3 and the average   number of centromeres 17 signals per nucleus is 3.1 (ratio HER2/CEN17 =   1.7). This is classified as equivocal result. Additional FISH assay was   performed with additional probes (RA/1 locus) which also yielded equivocal   results. Immunohistochemical stain for HER2 is warranted on this core   biopsy if the block becomes available. Alternatively, repeat testing may be   performed on the surgical specimen.       2.  Status post lumpectomy on June 30, 2014 final pathology showed invasive ductal carcinoma 1.7 cm grade 3 1 out of 2 lymph nodes involved 0.4 cm tumor in the lymph node ER +30% [place] +35% HER2/matt negative  3.  Status post AC followed by Taxol Herceptin completed in 2015 started Femara from September 2015 to October 2015 then restarted in December 2015  4.  Radiation therapy from January 2015 to February 2015  5.  Letrozole 2015 on anastrozole December 2018 through 2019 and exemestane Status post exemestane completed in October 2022 She completed 8 years of therapy  5 dexa November 2021 normal  6 mammogram 11/2021 normal     Interval history:Areli is here for follow up. She had elevated Ferritin She went to her primary care physician as she was having significant amount of hair loss.  She is on a shake diet and has lost about 60 pounds over the last year.  Ferritin was elevated at 461 2 years ago it was at 367.  She stopped taking the statins last year on her own.   Her iron sat is  normal . She is seeing derm and follow up and has a new ear area that was biopsied    REVIEW OF SYSTEMS:   14 point ROS was reviewed and is negative other than as noted above in HPI.       HOME MEDICATIONS:  Current Outpatient Medications   Medication Sig Dispense Refill    Calcium-Magnesium-Vitamin D (CALCIUM MAGNESIUM PO) Take by mouth 3 times daily      Caprylic Acid LIQD       Cholecalciferol (VITAMIN D) 1000 UNITS capsule Take 2 capsules by mouth daily      Flaxseed, Linseed, (FLAX SEED OIL PO) 2 capsules daily      glucosamine-chondroitin 500-400 MG CAPS Take 1 capsule by mouth daily      propranolol ER (INDERAL LA) 60 MG 24 hr capsule Take 120 mg by mouth daily       simvastatin (ZOCOR) 10 MG tablet Take 1 tablet (10 mg) by mouth At Bedtime 90 tablet 3    TURMERIC PO 1 capsules daily      simvastatin (ZOCOR) 10 MG tablet Take 1 tablet (10 mg) by mouth At Bedtime (Patient not taking: Reported on 11/10/2023) 90 tablet 1         ALLERGIES:  No Known Allergies      PAST MEDICAL HISTORY:  Past Medical History:   Diagnosis Date    Breast cancer (H)     Essential tremor     NO ACTIVE PROBLEMS     Shingles          PAST SURGICAL HISTORY:  Past Surgical History:   Procedure Laterality Date    NO HISTORY OF SURGERY      RHINOPLASTY           SOCIAL HISTORY:  Social History     Socioeconomic History    Marital status:      Spouse name: Not on file    Number of children: Not on file    Years of education: Not on file    Highest education level: Not on file   Occupational History    Not on file   Tobacco Use    Smoking status: Never    Smokeless tobacco: Not on file   Substance and Sexual Activity    Alcohol use: No    Drug use: No    Sexual activity: Yes     Partners: Male   Other Topics Concern    Parent/sibling w/ CABG, MI or angioplasty before 65F 55M? Not Asked     Service Not Asked    Blood Transfusions Not Asked    Caffeine Concern No     Comment: occ     Occupational Exposure Not Asked    Hobby  Hazards Not Asked    Sleep Concern No    Stress Concern No    Weight Concern No    Special Diet No    Back Care Not Asked    Exercise Yes     Comment: 2x weekly goes to gym     Bike Helmet Not Asked    Seat Belt Yes    Self-Exams Not Asked   Social History Narrative    Not on file     Social Determinants of Health     Financial Resource Strain: Not on file   Food Insecurity: Not on file   Transportation Needs: Not on file   Physical Activity: Not on file   Stress: Not on file   Social Connections: Not on file   Interpersonal Safety: Not on file   Housing Stability: Not on file         FAMILY HISTORY:  Family History   Problem Relation Age of Onset    Hypertension Mother     Obesity Mother     Lipids Father     Obesity Sister     Cerebrovascular Disease Sister         ?    Cancer Other     Cardiovascular Other          PHYSICAL EXAM:  Vital signs:  noted   ECO  GENERAL/CONSTITUTIONAL: No acute distress.  EYES: Pupils are equal, round, and react to light and accommodation. Extraocular movements intact.  No scleral icterus.  ENT/MOUTH: Neck supple. Oropharynx clear, no mucositis.  LYMPH: No anterior cervical, posterior cervical, supraclavicular, axillary or inguinal adenopathy.   RESPIRATORY: Clear to auscultation bilaterally. No crackles or wheezing.   CARDIOVASCULAR: Regular rate and rhythm without murmurs, gallops, or rubs.  GASTROINTESTINAL: No hepatosplenomegaly, masses, or tenderness. The patient has normal bowel sounds. No guarding.  No distention.  MUSCULOSKELETAL: Warm and well-perfused, no cyanosis, clubbing, or edema.  NEUROLOGIC: Cranial nerves II-XII are intact. Alert, oriented, answers questions appropriately.  INTEGUMENTARY: No rashes or jaundice.  GAIT: Steady, does not use assistive device  Bilateral breast exam is normal  Biopsy right ear    LABS:  CBC RESULTS:   Recent Labs   Pending    PATHOLOGY:  As per hPI      IMAGING:  Mammogram 2021 normal     ASSESSMENT/PLAN:  Areli Hickman is a 52 year  old female with breast cancer     1) breast cancer   On surveillance  Labs mammogram and md visit in year    2 ) elevated LDL  Restart zocor  Recheck in a  year    3) elevated ferritin  Liver ultrasound negative for fatty liver  If remains elevated (>> may have had covid) will do ct and bone scan to rule out mets    Total time spent on day of visit, including review of tests, obtaining/reviewing separately obtained history, ordering medications/tests/procedures, communicating with PCP/consultants, and documenting in electronic medical record: 40 minutes   Mike Carson MD  Hematology/Oncology  [facility]

## 2023-11-10 NOTE — PROGRESS NOTES
"Oncology Rooming Note    November 10, 2023 10:30 AM   Areli Hickman is a 53 year old female who presents for:    Chief Complaint   Patient presents with    Oncology Clinic Visit     Breast cancer (H)     Initial Vitals: /73   Pulse 65   Resp 16   Ht 1.676 m (5' 6\")   Wt 67.7 kg (149 lb 3.2 oz)   SpO2 98%   BMI 24.08 kg/m   Estimated body mass index is 24.08 kg/m  as calculated from the following:    Height as of this encounter: 1.676 m (5' 6\").    Weight as of this encounter: 67.7 kg (149 lb 3.2 oz). Body surface area is 1.78 meters squared.  Data Unavailable Comment: Data Unavailable   No LMP recorded.  Allergies reviewed: Yes  Medications reviewed: Yes    Medications: Medication refills not needed today.  Pharmacy name entered into [employer]: [facility] #[id number] [place] [id number]    Clinical concerns: Areli has some concerns about her rising iron levels. She has been taking protein shakes for weight loss and is wondering if this has contributed.      Sarah Butt MA            "

## 2023-11-14 DIAGNOSIS — C50.211 MALIGNANT NEOPLASM OF UPPER-INNER QUADRANT OF RIGHT BREAST IN FEMALE, ESTROGEN RECEPTOR POSITIVE (H): Primary | ICD-10-CM

## 2023-11-14 DIAGNOSIS — R79.89 ELEVATED FERRITIN: ICD-10-CM

## 2023-11-14 DIAGNOSIS — Z17.0 MALIGNANT NEOPLASM OF UPPER-INNER QUADRANT OF RIGHT BREAST IN FEMALE, ESTROGEN RECEPTOR POSITIVE (H): Primary | ICD-10-CM

## 2023-11-14 DIAGNOSIS — M54.50 LOW BACK PAIN, UNSPECIFIED BACK PAIN LATERALITY, UNSPECIFIED CHRONICITY, UNSPECIFIED WHETHER SCIATICA PRESENT: ICD-10-CM

## 2023-11-22 ENCOUNTER — HOSPITAL ENCOUNTER (OUTPATIENT)
Dept: NUCLEAR MEDICINE | Facility: CLINIC | Age: 53
Setting detail: NUCLEAR MEDICINE
Discharge: HOME | End: 2023-11-22
Attending: INTERNAL MEDICINE
Payer: COMMERCIAL

## 2023-11-22 ENCOUNTER — HOSPITAL ENCOUNTER (OUTPATIENT)
Dept: CT IMAGING | Facility: CLINIC | Age: 53
Discharge: HOME | End: 2023-11-22
Attending: INTERNAL MEDICINE | Admitting: INTERNAL MEDICINE
Payer: COMMERCIAL

## 2023-11-22 DIAGNOSIS — M54.50 LOW BACK PAIN, UNSPECIFIED BACK PAIN LATERALITY, UNSPECIFIED CHRONICITY, UNSPECIFIED WHETHER SCIATICA PRESENT: ICD-10-CM

## 2023-11-22 DIAGNOSIS — R79.89 ELEVATED FERRITIN: ICD-10-CM

## 2023-11-22 DIAGNOSIS — Z17.0 MALIGNANT NEOPLASM OF UPPER-INNER QUADRANT OF RIGHT BREAST IN FEMALE, ESTROGEN RECEPTOR POSITIVE (H): ICD-10-CM

## 2023-11-22 DIAGNOSIS — C50.211 MALIGNANT NEOPLASM OF UPPER-INNER QUADRANT OF RIGHT BREAST IN FEMALE, ESTROGEN RECEPTOR POSITIVE (H): ICD-10-CM

## 2023-11-22 PROCEDURE — 343N000001 HC RX 343: Performed by: INTERNAL MEDICINE

## 2023-11-22 PROCEDURE — 250N000009 HC RX 250: Performed by: INTERNAL MEDICINE

## 2023-11-22 PROCEDURE — A9503 TC99M MEDRONATE: HCPCS | Performed by: INTERNAL MEDICINE

## 2023-11-22 PROCEDURE — 74177 CT ABD & PELVIS W/CONTRAST: CPT

## 2023-11-22 PROCEDURE — 250N000011 HC RX IP 250 OP 636: Performed by: INTERNAL MEDICINE

## 2023-11-22 PROCEDURE — 78306 BONE IMAGING WHOLE BODY: CPT

## 2023-11-22 RX ORDER — TC 99M MEDRONATE 20 MG/10ML
25 INJECTION, POWDER, LYOPHILIZED, FOR SOLUTION INTRAVENOUS ONCE
Status: COMPLETED | OUTPATIENT
Start: 2023-11-22 | End: 2023-11-22

## 2023-11-22 RX ORDER — IOPAMIDOL 755 MG/ML
74 INJECTION, SOLUTION INTRAVASCULAR ONCE
Status: COMPLETED | OUTPATIENT
Start: 2023-11-22 | End: 2023-11-22

## 2023-11-22 RX ADMIN — IOPAMIDOL 74 ML: 755 INJECTION, SOLUTION INTRAVENOUS at 10:27

## 2023-11-22 RX ADMIN — SODIUM CHLORIDE 61 ML: 9 INJECTION, SOLUTION INTRAVENOUS at 10:28

## 2023-11-22 RX ADMIN — TC 99M MEDRONATE 25.7 MILLICURIE: 20 INJECTION, POWDER, LYOPHILIZED, FOR SOLUTION INTRAVENOUS at 10:10

## 2023-12-21 ENCOUNTER — TELEPHONE (OUTPATIENT)
Dept: ONCOLOGY | Facility: CLINIC | Age: 53
End: 2023-12-21

## 2023-12-21 DIAGNOSIS — R89.9 ABNORMAL LABORATORY TEST: Primary | ICD-10-CM

## 2023-12-21 NOTE — TELEPHONE ENCOUNTER
Areli called clinic inquiring about her elevated Ferritin when she was in clinic last. Bone Scan and CT were done per Dr. Carson regarding elevated Ferritin. Writer sent patient a MyChart that results were normal. Patient also had a mammogram, bone density drawn which are not visible in Careeverywhere. Requested that she have those records faxed.          Areli would like to have a repeat Ferritin level drawn. Level will be drawn at [facility] and  writer will follow up. Afsaneh Khalil RN,BSN,OCN,CBCN

## 2023-12-22 ENCOUNTER — LAB (OUTPATIENT)
Dept: LAB | Facility: CLINIC | Age: 53
End: 2023-12-22
Payer: COMMERCIAL

## 2023-12-22 DIAGNOSIS — R89.9 ABNORMAL LABORATORY TEST: ICD-10-CM

## 2023-12-22 LAB — FERRITIN SERPL-MCNC: 439 NG/ML (ref 11–328)

## 2023-12-22 PROCEDURE — 36415 COLL VENOUS BLD VENIPUNCTURE: CPT

## 2023-12-22 PROCEDURE — 82728 ASSAY OF FERRITIN: CPT

## 2023-12-28 ENCOUNTER — TELEPHONE (OUTPATIENT)
Dept: ONCOLOGY | Facility: CLINIC | Age: 53
End: 2023-12-28
Payer: COMMERCIAL

## 2023-12-28 DIAGNOSIS — R89.9 ABNORMAL LABORATORY TEST: Primary | ICD-10-CM

## 2023-12-28 NOTE — TELEPHONE ENCOUNTER
Called Areli left her a message that a [place] message was sent with Dr. Carson's explanation of her current Ferritin level. Recommendation is follow up in 6 months to 1 year. Per Dr. Carson's last dictation she is due for labs and follow up in 11 2024. Writer will add Ferritin level to those labs. Afsaneh Khalil RN,BSN,OCN,CBCN

## 2024-09-08 ENCOUNTER — HEALTH MAINTENANCE LETTER (OUTPATIENT)
Age: 54
End: 2024-09-08

## 2024-09-19 DIAGNOSIS — Z13.1 ENCOUNTER FOR SCREENING EXAMINATION FOR IMPAIRED GLUCOSE REGULATION AND DIABETES MELLITUS: Primary | ICD-10-CM

## 2024-11-11 DIAGNOSIS — Z17.0 MALIGNANT NEOPLASM OF UPPER-INNER QUADRANT OF RIGHT BREAST IN FEMALE, ESTROGEN RECEPTOR POSITIVE (H): ICD-10-CM

## 2024-11-11 DIAGNOSIS — R79.89 ELEVATED FERRITIN: ICD-10-CM

## 2024-11-11 DIAGNOSIS — C50.211 MALIGNANT NEOPLASM OF UPPER-INNER QUADRANT OF RIGHT BREAST IN FEMALE, ESTROGEN RECEPTOR POSITIVE (H): ICD-10-CM

## 2024-11-11 DIAGNOSIS — E78.00 HYPERCHOLESTEREMIA: ICD-10-CM

## 2024-11-21 RX ORDER — SIMVASTATIN 10 MG
10 TABLET ORAL AT BEDTIME
Qty: 90 TABLET | Refills: 3 | OUTPATIENT
Start: 2024-11-21

## 2024-12-04 ENCOUNTER — ONCOLOGY VISIT (OUTPATIENT)
Dept: ONCOLOGY | Facility: CLINIC | Age: 54
End: 2024-12-04
Attending: INTERNAL MEDICINE
Payer: COMMERCIAL

## 2024-12-04 VITALS
SYSTOLIC BLOOD PRESSURE: 132 MMHG | RESPIRATION RATE: 14 BRPM | HEART RATE: 75 BPM | BODY MASS INDEX: 27.83 KG/M2 | TEMPERATURE: 97.9 F | OXYGEN SATURATION: 94 % | WEIGHT: 172.4 LBS | DIASTOLIC BLOOD PRESSURE: 82 MMHG

## 2024-12-04 DIAGNOSIS — Z17.0 MALIGNANT NEOPLASM OF UPPER-INNER QUADRANT OF RIGHT BREAST IN FEMALE, ESTROGEN RECEPTOR POSITIVE (H): Primary | ICD-10-CM

## 2024-12-04 DIAGNOSIS — C50.211 MALIGNANT NEOPLASM OF UPPER-INNER QUADRANT OF RIGHT BREAST IN FEMALE, ESTROGEN RECEPTOR POSITIVE (H): Primary | ICD-10-CM

## 2024-12-04 PROCEDURE — 99215 OFFICE O/P EST HI 40 MIN: CPT | Performed by: INTERNAL MEDICINE

## 2024-12-04 PROCEDURE — G2211 COMPLEX E/M VISIT ADD ON: HCPCS | Performed by: INTERNAL MEDICINE

## 2024-12-04 PROCEDURE — G0463 HOSPITAL OUTPT CLINIC VISIT: HCPCS | Performed by: INTERNAL MEDICINE

## 2024-12-04 RX ORDER — ATORVASTATIN CALCIUM 10 MG/1
10 TABLET, FILM COATED ORAL DAILY
COMMUNITY
Start: 2024-11-15

## 2024-12-04 ASSESSMENT — PAIN SCALES - GENERAL: PAINLEVEL_OUTOF10: NO PAIN (0)

## 2024-12-04 NOTE — PROGRESS NOTES
[facility]    Hematology/Oncology return patient note    Today's Date: 12/04/2024  Reason for Consult: breast cancer      HISTORY OF PRESENT ILLNESS: Areli  Is a very pleasant 54 year old female with a history Of breast cancer.    Oncologic history is as follows    1.Right breast invasive ductal carcinoma [place] grade 2 ER/[place] positive HER2/matt equivocal    3. HER2 FISH is equivocal per outside report, see comment      COMMENT   The average number of HER2 signals per nucleus is 5.3 and the average   number of centromeres 17 signals per nucleus is 3.1 (ratio HER2/CEN17 =   1.7). This is classified as equivocal result. Additional FISH assay was   performed with additional probes (RA/1 locus) which also yielded equivocal   results. Immunohistochemical stain for HER2 is warranted on this core   biopsy if the block becomes available. Alternatively, repeat testing may be   performed on the surgical specimen.       2.  Status post lumpectomy on June 30, 2014 final pathology showed invasive ductal carcinoma 1.7 cm grade 3 1 out of 2 lymph nodes involved 0.4 cm tumor in the lymph node ER +30% WI +35% HER2/matt negative  3.  Status post AC followed by Taxol Herceptin completed in 2015 started Femara from September 2015 to October 2015 then restarted in December 2015  4.  Radiation therapy from January 2015 to February 2015  5.  Letrozole 2015 on anastrozole December 2018 through 2019 and exemestane Status post exemestane completed in October 2022 She completed 8 years of therapy on sureillance        Interval history:Areli is here for follow up she is doing well.  her pcp changed her statin as they think it would work better. Markers and labs are normal     REVIEW OF SYSTEMS:   14 point ROS was reviewed and is negative other than as noted above in HPI.       HOME MEDICATIONS:  Current Outpatient Medications   Medication Sig Dispense Refill    atorvastatin (LIPITOR) 10 MG tablet Take 10 mg by  mouth daily.      Calcium-Magnesium-Vitamin D (CALCIUM MAGNESIUM PO) Take by mouth 3 times daily      Caprylic Acid LIQD       Cholecalciferol (VITAMIN D) 1000 UNITS capsule Take 2 capsules by mouth daily      Flaxseed, Linseed, (FLAX SEED OIL PO) 2 capsules daily      glucosamine-chondroitin 500-400 MG CAPS Take 1 capsule by mouth daily      propranolol ER (INDERAL LA) 60 MG 24 hr capsule Take 120 mg by mouth daily       TURMERIC PO 1 capsules daily           ALLERGIES:  No Known Allergies      PAST MEDICAL HISTORY:  Past Medical History:   Diagnosis Date    Breast cancer (H)     Essential tremor     NO ACTIVE PROBLEMS     Shingles          PAST SURGICAL HISTORY:  Past Surgical History:   Procedure Laterality Date    NO HISTORY OF SURGERY      RHINOPLASTY           SOCIAL HISTORY:  Social History     Socioeconomic History    Marital status:      Spouse name: Not on file    Number of children: Not on file    Years of education: Not on file    Highest education level: Not on file   Occupational History    Not on file   Tobacco Use    Smoking status: Never    Smokeless tobacco: Not on file   Substance and Sexual Activity    Alcohol use: No    Drug use: No    Sexual activity: Yes     Partners: Male   Other Topics Concern    Parent/sibling w/ CABG, MI or angioplasty before 65F 55M? Not Asked     Service Not Asked    Blood Transfusions Not Asked    Caffeine Concern No     Comment: occ     Occupational Exposure Not Asked    Hobby Hazards Not Asked    Sleep Concern No    Stress Concern No    Weight Concern No    Special Diet No    Back Care Not Asked    Exercise Yes     Comment: 2x weekly goes to gym     Bike Helmet Not Asked    Seat Belt Yes    Self-Exams Not Asked   Social History Narrative    Not on file     Social Drivers of Health     Financial Resource Strain: Low Risk  (11/15/2024)    Received from [facility]    Financial Resource Strain     Difficulty of Paying  Living Expenses: 3     Difficulty of Paying Living Expenses: Not on file   Food Insecurity: No Food Insecurity (11/15/2024)    Received from [facility]    Food Insecurity     Do you worry your food will run out before you are able to buy more?: 1   Transportation Needs: No Transportation Needs (11/15/2024)    Received from [facility]    Transportation Needs     Does lack of transportation keep you from medical appointments?: 1     Does lack of transportation keep you from work, meetings or getting things that you need?: 1   Physical Activity: Not on file   Stress: Not on file   Social Connections: Socially Integrated (11/15/2024)    Received from [facility]    Social Connections     Do you often feel lonely or isolated from those around you?: 0   Interpersonal Safety: Not on file   Housing Stability: Low Risk  (11/15/2024)    Received from [facility]    Housing Stability     What is your housing situation today?: 1         FAMILY HISTORY:  Family History   Problem Relation Age of Onset    Hypertension Mother     Obesity Mother     Lipids Father     Obesity Sister     Cerebrovascular Disease Sister         ?    Cancer Other     Cardiovascular Other          PHYSICAL EXAM:  Vital signs:  noted   ECO  S/p     LABS:  CBC RESULTS:   Recent Labs   Pending    PATHOLOGY:  As per hPI      IMAGING:  Mammogram   2024 normal     ASSESSMENT/PLAN:  Areli Hickman is a 54 year old female with breast cancer     1) breast cancer   On surveillance  Labs mammogram and md visit in year    2 ) elevated LDL  Being managed by PCP    3) elevated ferritin  Liver ultrasound negative for fatty liver, stable        Total time spent on day of visit, including review of tests, obtaining/reviewing separately obtained history, ordering medications/tests/procedures, communicating with PCP/consultants, and  documenting in electronic medical record: 40 minutes   Mike Carson MD  Hematology/Oncology  [facility]

## 2024-12-04 NOTE — LETTER
"12/4/2024      Areli Hickman  [address]  [place] [address]      Dear Colleague,    Thank you for referring your patient, Areli Hickman, to the [facility]. Please see a copy of my visit note below.    Oncology Rooming Note    December 4, 2024 1:27 PM   Areli Hickman is a 54 year old female who presents for:    Chief Complaint   Patient presents with     Oncology Clinic Visit     Initial Vitals: /82   Pulse 75   Temp 97.9  F (36.6  C) (Oral)   Resp 14   Wt 78.2 kg (172 lb 6.4 oz)   SpO2 94%   BMI 27.83 kg/m   Estimated body mass index is 27.83 kg/m  as calculated from the following:    Height as of 11/10/23: 1.676 m (5' 6\").    Weight as of this encounter: 78.2 kg (172 lb 6.4 oz). Body surface area is 1.91 meters squared.  No Pain (0) Comment: Data Unavailable   No LMP recorded.  Allergies reviewed: Yes  Medications reviewed: Yes    Medications: Medication refills not needed today.  Pharmacy name entered into [employer]: [id number] [id number] [address] [place] [id number]    Frailty Screening:   Is the patient here for a new oncology consult visit in cancer care? 2. No      Clinical concerns: no       Shari J. Schoenberger, [facility]    Hematology/Oncology return patient note    Today's Date: 12/04/2024  Reason for Consult: breast cancer      HISTORY OF PRESENT ILLNESS: Areli  Is a very pleasant 54 year old female with a history Of breast cancer.    Oncologic history is as follows    1.Right breast invasive ductal carcinoma [place] grade 2 ER/[place] positive HER2/matt equivocal    3. HER2 FISH is equivocal per outside report, see comment      COMMENT   The average number of HER2 signals per nucleus is 5.3 and the average   number of centromeres 17 signals per nucleus is 3.1 (ratio HER2/CEN17 =   1.7). This is classified as equivocal result. Additional FISH assay was   performed with additional probes (RA/1 locus) which " also yielded equivocal   results. Immunohistochemical stain for HER2 is warranted on this core   biopsy if the block becomes available. Alternatively, repeat testing may be   performed on the surgical specimen.       2.  Status post lumpectomy on June 30, 2014 final pathology showed invasive ductal carcinoma 1.7 cm grade 3 1 out of 2 lymph nodes involved 0.4 cm tumor in the lymph node ER +30% ND +35% HER2/matt negative  3.  Status post AC followed by Taxol Herceptin completed in 2015 started Femara from September 2015 to October 2015 then restarted in December 2015  4.  Radiation therapy from January 2015 to February 2015  5.  Letrozole 2015 on anastrozole December 2018 through 2019 and exemestane Status post exemestane completed in October 2022 She completed 8 years of therapy on sureillance        Interval history:Areli is here for follow up she is doing well.  her pcp changed her statin as they think it would work better. Markers and labs are normal     REVIEW OF SYSTEMS:   14 point ROS was reviewed and is negative other than as noted above in HPI.       HOME MEDICATIONS:  Current Outpatient Medications   Medication Sig Dispense Refill     atorvastatin (LIPITOR) 10 MG tablet Take 10 mg by mouth daily.       Calcium-Magnesium-Vitamin D (CALCIUM MAGNESIUM PO) Take by mouth 3 times daily       Caprylic Acid LIQD        Cholecalciferol (VITAMIN D) 1000 UNITS capsule Take 2 capsules by mouth daily       Flaxseed, Linseed, (FLAX SEED OIL PO) 2 capsules daily       glucosamine-chondroitin 500-400 MG CAPS Take 1 capsule by mouth daily       propranolol ER (INDERAL LA) 60 MG 24 hr capsule Take 120 mg by mouth daily        TURMERIC PO 1 capsules daily           ALLERGIES:  No Known Allergies      PAST MEDICAL HISTORY:  Past Medical History:   Diagnosis Date     Breast cancer (H)      Essential tremor      NO ACTIVE PROBLEMS      Shingles          PAST SURGICAL HISTORY:  Past Surgical History:   Procedure Laterality  Date     NO HISTORY OF SURGERY       RHINOPLASTY           SOCIAL HISTORY:  Social History     Socioeconomic History     Marital status:      Spouse name: Not on file     Number of children: Not on file     Years of education: Not on file     Highest education level: Not on file   Occupational History     Not on file   Tobacco Use     Smoking status: Never     Smokeless tobacco: Not on file   Substance and Sexual Activity     Alcohol use: No     Drug use: No     Sexual activity: Yes     Partners: Male   Other Topics Concern     Parent/sibling w/ CABG, MI or angioplasty before 65F 55M? Not Asked      Service Not Asked     Blood Transfusions Not Asked     Caffeine Concern No     Comment: occ      Occupational Exposure Not Asked     Hobby Hazards Not Asked     Sleep Concern No     Stress Concern No     Weight Concern No     Special Diet No     Back Care Not Asked     Exercise Yes     Comment: 2x weekly goes to gym      Bike Helmet Not Asked     Seat Belt Yes     Self-Exams Not Asked   Social History Narrative     Not on file     Social Drivers of Health     Financial Resource Strain: Low Risk  (11/15/2024)    Received from [facility]    Financial Resource Strain      Difficulty of Paying Living Expenses: 3      Difficulty of Paying Living Expenses: Not on file   Food Insecurity: No Food Insecurity (11/15/2024)    Received from [facility]    Food Insecurity      Do you worry your food will run out before you are able to buy more?: 1   Transportation Needs: No Transportation Needs (11/15/2024)    Received from [facility]    Transportation Needs      Does lack of transportation keep you from medical appointments?: 1      Does lack of transportation keep you from work, meetings or getting things that you need?: 1   Physical Activity: Not on file   Stress: Not on file   Social Connections: Socially Integrated  (11/15/2024)    Received from [facility]    Social Connections      Do you often feel lonely or isolated from those around you?: 0   Interpersonal Safety: Not on file   Housing Stability: Low Risk  (11/15/2024)    Received from [facility]    Housing Stability      What is your housing situation today?: 1         FAMILY HISTORY:  Family History   Problem Relation Age of Onset     Hypertension Mother      Obesity Mother      Lipids Father      Obesity Sister      Cerebrovascular Disease Sister         ?     Cancer Other      Cardiovascular Other          PHYSICAL EXAM:  Vital signs:  noted   ECO  S/p     LABS:  CBC RESULTS:   Recent Labs   Pending    PATHOLOGY:  As per hPI      IMAGING:  Mammogram   2024 normal     ASSESSMENT/PLAN:  Areli Hickman is a 54 year old female with breast cancer     1) breast cancer   On surveillance  Labs mammogram and md visit in year    2 ) elevated LDL  Being managed by PCP    3) elevated ferritin  Liver ultrasound negative for fatty liver, stable        Total time spent on day of visit, including review of tests, obtaining/reviewing separately obtained history, ordering medications/tests/procedures, communicating with PCP/consultants, and documenting in electronic medical record: 40 minutes   Mike Carson MD  Hematology/Oncology  [facility]      Again, thank you for allowing me to participate in the care of your patient.        Sincerely,        Mike Carson MD   Alert and oriented to person, place and time

## 2024-12-04 NOTE — PROGRESS NOTES
"Oncology Rooming Note    December 4, 2024 1:27 PM   Areli Hickman is a 54 year old female who presents for:    Chief Complaint   Patient presents with    Oncology Clinic Visit     Initial Vitals: /82   Pulse 75   Temp 97.9  F (36.6  C) (Oral)   Resp 14   Wt 78.2 kg (172 lb 6.4 oz)   SpO2 94%   BMI 27.83 kg/m   Estimated body mass index is 27.83 kg/m  as calculated from the following:    Height as of 11/10/23: 1.676 m (5' 6\").    Weight as of this encounter: 78.2 kg (172 lb 6.4 oz). Body surface area is 1.91 meters squared.  No Pain (0) Comment: Data Unavailable   No LMP recorded.  Allergies reviewed: Yes  Medications reviewed: Yes    Medications: Medication refills not needed today.  Pharmacy name entered into [employer]: [facility] #[id number] [place] [address]    Frailty Screening:   Is the patient here for a new oncology consult visit in cancer care? 2. No      Clinical concerns: no       Shari J. Schoenberger, CMA            "